# Patient Record
Sex: FEMALE | Race: BLACK OR AFRICAN AMERICAN | NOT HISPANIC OR LATINO | URBAN - METROPOLITAN AREA
[De-identification: names, ages, dates, MRNs, and addresses within clinical notes are randomized per-mention and may not be internally consistent; named-entity substitution may affect disease eponyms.]

---

## 2022-03-29 ENCOUNTER — OFFICE VISIT (OUTPATIENT)
Dept: URBAN - METROPOLITAN AREA CLINIC 25 | Facility: CLINIC | Age: 62
End: 2022-03-29
Payer: COMMERCIAL

## 2022-03-29 ENCOUNTER — DASHBOARD ENCOUNTERS (OUTPATIENT)
Age: 62
End: 2022-03-29

## 2022-03-29 DIAGNOSIS — R10.13 DYSPEPSIA: ICD-10-CM

## 2022-03-29 DIAGNOSIS — K21.9 GERD: ICD-10-CM

## 2022-03-29 DIAGNOSIS — K59.04 CHRONIC IDIOPATHIC CONSTIPATION: ICD-10-CM

## 2022-03-29 PROCEDURE — 99214 OFFICE O/P EST MOD 30 MIN: CPT | Performed by: INTERNAL MEDICINE

## 2022-03-29 RX ORDER — LINACLOTIDE 290 UG/1
1 CAPSULE AT LEAST 30 MINUTES BEFORE THE FIRST MEAL OF THE DAY ON AN EMPTY STOMACH CAPSULE, GELATIN COATED ORAL ONCE A DAY
Qty: 90 | Refills: 3 | OUTPATIENT
Start: 2022-03-29 | End: 2023-03-24

## 2022-03-29 RX ORDER — LINACLOTIDE 290 UG/1
TAKE 1 CAPSULE BY ORAL ROUTE DAILY CAPSULE, GELATIN COATED ORAL 1
Qty: 90 | Refills: 3 | Status: ACTIVE | COMMUNITY
Start: 2019-03-27

## 2022-03-29 RX ORDER — PANTOPRAZOLE SODIUM 40 MG/1
1 TABLET TABLET, DELAYED RELEASE ORAL ONCE A DAY
Qty: 30 TABLET | Refills: 5 | OUTPATIENT
Start: 2022-03-29

## 2022-03-29 NOTE — HPI-TODAY'S VISIT:
The patient was referred by Dr. Geo Johnson for colonoscopy .   A copy of this document is being forwarded to the referring provider.  Patient seen by me in 2019.  She ahd a normal screening colonosocpy with a 10 year recall.  We did start her on Lizness 290 for her IBS-C.  SHe never got the medication.  She still has the constipation.  SHe needs a laxataive for a BM.  She does this a few times a week.  She denies LGI bleed or melena.  She denies anorexia or weight loss.  She denies abdominal pain.  SHe kay shave bloat and gas.  She has chronic GERD.  It occurs daily.  She has to take Pepcid AC.  Her GERD has been present for a few years.  It has been getting worse.  She denies N/V/dysphagia/early satiety

## 2022-04-01 PROBLEM — 82934008 CHRONIC IDIOPATHIC CONSTIPATION: Status: ACTIVE | Noted: 2022-03-29

## 2022-04-02 PROBLEM — 235595009 GASTROESOPHAGEAL REFLUX DISEASE: Status: ACTIVE | Noted: 2022-03-29

## 2022-04-27 ENCOUNTER — OFFICE VISIT (OUTPATIENT)
Dept: URBAN - METROPOLITAN AREA SURGERY CENTER 20 | Facility: SURGERY CENTER | Age: 62
End: 2022-04-27